# Patient Record
Sex: FEMALE | URBAN - METROPOLITAN AREA
[De-identification: names, ages, dates, MRNs, and addresses within clinical notes are randomized per-mention and may not be internally consistent; named-entity substitution may affect disease eponyms.]

---

## 2021-10-14 ENCOUNTER — PREPPED CHART (OUTPATIENT)
Dept: URBAN - METROPOLITAN AREA CLINIC 49 | Facility: CLINIC | Age: 71
End: 2021-10-14

## 2021-10-14 PROBLEM — H25.13 NS CATARACT: Noted: 2021-10-14

## 2021-10-14 PROBLEM — Z98.890 RETINAL SURGERY: Noted: 2021-10-14

## 2021-10-14 PROBLEM — H43.812 POSTERIOR VITREOUS DETACHMENT: Status: STABILIZING | Noted: 2021-10-14

## 2021-10-14 PROBLEM — H35.341 MACULAR HOLE: Status: WELL-CONTROLLED | Noted: 2021-10-14

## 2021-10-14 PROBLEM — H35.341 MACULAR HOLE: Noted: 2021-10-14

## 2021-10-14 PROBLEM — H43.813 POSTERIOR VITREOUS DETACHMENT: Noted: 2021-10-14

## 2021-10-14 PROBLEM — H25.12 NS CATARACT: Noted: 2021-10-14

## 2021-10-14 PROBLEM — H43.813 POSTERIOR VITREOUS DETACHMENT: Status: STABILIZING | Noted: 2021-10-14

## 2021-10-14 PROBLEM — H35.373 EPIRETINAL MEMBRANE: Noted: 2021-10-14

## 2021-10-14 PROBLEM — H35.341 MACULAR HOLE: Status: STABILIZING | Noted: 2021-10-14

## 2022-03-18 ENCOUNTER — APPOINTMENT (RX ONLY)
Dept: URBAN - METROPOLITAN AREA CLINIC 41 | Facility: CLINIC | Age: 72
Setting detail: DERMATOLOGY
End: 2022-03-18

## 2022-03-18 DIAGNOSIS — Z41.9 ENCOUNTER FOR PROCEDURE FOR PURPOSES OTHER THAN REMEDYING HEALTH STATE, UNSPECIFIED: ICD-10-CM

## 2022-03-18 PROCEDURE — ? COSMETIC CONSULTATION: LASER RESURFACING

## 2022-03-18 PROCEDURE — ? COSMETIC CONSULTATION: BOTOX

## 2022-03-18 PROCEDURE — ? PRESCRIPTION

## 2022-03-18 PROCEDURE — ? COSMETIC CONSULTATION: FILLERS

## 2022-03-18 PROCEDURE — ? ADDITIONAL NOTES

## 2022-03-18 RX ORDER — LIDOCAINE AND PRILOCAINE 25; 25 MG/G; MG/G
CREAM TOPICAL
Qty: 30 | Refills: 0 | Status: ERX | COMMUNITY
Start: 2022-03-18

## 2022-03-18 RX ORDER — VALACYCLOVIR HYDROCHLORIDE 1 G/1
TABLET, FILM COATED ORAL
Qty: 6 | Refills: 0 | Status: ERX | COMMUNITY
Start: 2022-03-18

## 2022-03-18 RX ADMIN — VALACYCLOVIR HYDROCHLORIDE: 1 TABLET, FILM COATED ORAL at 00:00

## 2022-03-18 RX ADMIN — LIDOCAINE AND PRILOCAINE: 25; 25 CREAM TOPICAL at 00:00

## 2022-03-18 ASSESSMENT — LOCATION SIMPLE DESCRIPTION DERM
LOCATION SIMPLE: RIGHT EYELID
LOCATION SIMPLE: RIGHT CHEEK
LOCATION SIMPLE: LEFT TEMPLE
LOCATION SIMPLE: LEFT CHEEK

## 2022-03-18 ASSESSMENT — LOCATION DETAILED DESCRIPTION DERM
LOCATION DETAILED: RIGHT CENTRAL MALAR CHEEK
LOCATION DETAILED: RIGHT INFERIOR CENTRAL MALAR CHEEK
LOCATION DETAILED: LEFT LATERAL MALAR CHEEK
LOCATION DETAILED: LEFT INFERIOR CENTRAL MALAR CHEEK
LOCATION DETAILED: LEFT INFERIOR TEMPLE
LOCATION DETAILED: RIGHT LATERAL CANTHUS

## 2022-03-18 ASSESSMENT — LOCATION ZONE DERM
LOCATION ZONE: FACE
LOCATION ZONE: EYELID

## 2022-03-18 NOTE — PROCEDURE: ADDITIONAL NOTES
Additional Notes: Pt states that her goal is to look less fatigued. \\n\\nEG recommends botox to help loosen eye muscles. Pt denies and has she does not want this\\n\\nEG notes of excess skin to upper eyelid. A blepharoplasty would help with this, however this should be performed by an oculoplastic surgeon. \\n\\nCO2 laser resurfacing can help with the texture of the skin. Should avoid excess sun exposure post procedure. CoolPeel is on the gentler side with shorter recovery time. JO-ANN laser is stronger but has longer recovery time. Pt is leaning towards CoolPeel. However she likes to be out in the sun during the summer, so will consider scheduling this for the fall. EG notes that the CoolPeel can help with some SKs and lentigos on the face. Quoted at $1000 per session! $1200 if combo jo-ann and cool peel, and $2800 for a package of 3 CP.\\n\\nEG also discusses fillers. Takes about 2-4 weeks to integrate into tissue. Side effects may include swelling, bruising, tenderness. Also discusses the risk of occlusion, although hylanex can be used in these situations. EG rec starting with 2 - 3 syringes. Restylane Lyft would be $750 per syringe, and juvaderm voluma would be $1000 per syringe.\\n\\nEG discusses hydra facials, which can do deep cleaning of face and recommends pt see our aestheticians. EG also discusses products pt can use. Pt notes that she washes her face and moisturizes bid. Pt also wears sunscreen. EG notes that this is good and pt should continue, but she can use a post procedure kit with antioxidants that is normally used after cool peel. If pt is considering laser soon, pt should continue with gentle products.\\n\\nPt is most interested in CoolPeel and will consider scheduling for that. Pt was prescribed topical numbing just in case she wants to apply, and instructed to take 6 day course of valtrex starting the day before her procedure. Pt had these eRXed to her preferred pharmacy
Render Risk Assessment In Note?: no
Detail Level: Simple
Additional Notes: Patient consent was obtained to proceed with the visit and recommended plan of care after discussion of all risks and benefits, including the risks of COVID-19 exposure.

## 2022-04-18 ASSESSMENT — TONOMETRY: OD_IOP_MMHG: 17

## 2022-04-18 ASSESSMENT — VISUAL ACUITY: OD_CC: 20/200+1

## 2022-05-09 ENCOUNTER — FOLLOW UP (OUTPATIENT)
Dept: URBAN - METROPOLITAN AREA CLINIC 49 | Facility: CLINIC | Age: 72
End: 2022-05-09

## 2022-05-09 DIAGNOSIS — H35.341: ICD-10-CM

## 2022-05-09 DIAGNOSIS — H35.373: ICD-10-CM

## 2022-05-09 DIAGNOSIS — H43.813: ICD-10-CM

## 2022-05-09 PROCEDURE — 92014 COMPRE OPH EXAM EST PT 1/>: CPT

## 2022-05-09 PROCEDURE — 92134 CPTRZ OPH DX IMG PST SGM RTA: CPT

## 2022-05-09 PROCEDURE — 92202 OPSCPY EXTND ON/MAC DRAW: CPT

## 2022-05-09 ASSESSMENT — VISUAL ACUITY
OD_PH: 20/100-1
OS_CC: 20/40
OD_CC: 20/100-1

## 2022-05-09 ASSESSMENT — TONOMETRY
OD_IOP_MMHG: 13
OS_IOP_MMHG: 11

## 2022-11-17 ENCOUNTER — FOLLOW UP (OUTPATIENT)
Dept: URBAN - METROPOLITAN AREA CLINIC 49 | Facility: CLINIC | Age: 72
End: 2022-11-17

## 2022-11-17 DIAGNOSIS — H35.341: ICD-10-CM

## 2022-11-17 DIAGNOSIS — H35.373: ICD-10-CM

## 2022-11-17 DIAGNOSIS — H43.813: ICD-10-CM

## 2022-11-17 PROCEDURE — 92014 COMPRE OPH EXAM EST PT 1/>: CPT

## 2022-11-17 PROCEDURE — 92201 OPSCPY EXTND RTA DRAW UNI/BI: CPT

## 2022-11-17 PROCEDURE — 92134 CPTRZ OPH DX IMG PST SGM RTA: CPT

## 2022-11-17 ASSESSMENT — VISUAL ACUITY
OD_SC: 20/60
OD_PH: 20/25-2
OS_CC: 20/20

## 2022-11-17 ASSESSMENT — TONOMETRY
OD_IOP_MMHG: 10
OS_IOP_MMHG: 12

## 2023-05-18 ENCOUNTER — FOLLOW UP (OUTPATIENT)
Dept: URBAN - METROPOLITAN AREA CLINIC 49 | Facility: CLINIC | Age: 73
End: 2023-05-18

## 2023-05-18 DIAGNOSIS — H35.352: ICD-10-CM

## 2023-05-18 DIAGNOSIS — H35.372: ICD-10-CM

## 2023-05-18 DIAGNOSIS — H35.341: ICD-10-CM

## 2023-05-18 DIAGNOSIS — H43.813: ICD-10-CM

## 2023-05-18 DIAGNOSIS — H25.12: ICD-10-CM

## 2023-05-18 PROCEDURE — 92014 COMPRE OPH EXAM EST PT 1/>: CPT

## 2023-05-18 PROCEDURE — 92201 OPSCPY EXTND RTA DRAW UNI/BI: CPT

## 2023-05-18 PROCEDURE — 92134 CPTRZ OPH DX IMG PST SGM RTA: CPT

## 2023-05-18 ASSESSMENT — VISUAL ACUITY
OS_CC: 20/30-1
OD_CC: 20/20

## 2023-05-18 ASSESSMENT — TONOMETRY
OD_IOP_MMHG: 14
OS_IOP_MMHG: 15

## 2024-01-25 ENCOUNTER — FOLLOW UP (OUTPATIENT)
Dept: URBAN - METROPOLITAN AREA CLINIC 49 | Facility: CLINIC | Age: 74
End: 2024-01-25

## 2024-01-25 DIAGNOSIS — H35.341: ICD-10-CM

## 2024-01-25 DIAGNOSIS — H35.372: ICD-10-CM

## 2024-01-25 DIAGNOSIS — D31.32: ICD-10-CM

## 2024-01-25 DIAGNOSIS — H35.352: ICD-10-CM

## 2024-01-25 DIAGNOSIS — H43.813: ICD-10-CM

## 2024-01-25 DIAGNOSIS — H26.491: ICD-10-CM

## 2024-01-25 PROCEDURE — 92134 CPTRZ OPH DX IMG PST SGM RTA: CPT

## 2024-01-25 PROCEDURE — 92014 COMPRE OPH EXAM EST PT 1/>: CPT

## 2024-01-25 PROCEDURE — 92202 OPSCPY EXTND ON/MAC DRAW: CPT

## 2024-01-25 ASSESSMENT — VISUAL ACUITY
OS_SC: 20/20
OD_SC: 20/60
OD_PH: 20/30

## 2024-01-25 ASSESSMENT — TONOMETRY
OD_IOP_MMHG: 16
OS_IOP_MMHG: 15

## 2024-07-25 ENCOUNTER — FOLLOW UP (OUTPATIENT)
Dept: URBAN - METROPOLITAN AREA CLINIC 49 | Facility: CLINIC | Age: 74
End: 2024-07-25

## 2024-07-25 DIAGNOSIS — H35.372: ICD-10-CM

## 2024-07-25 DIAGNOSIS — H35.352: ICD-10-CM

## 2024-07-25 DIAGNOSIS — D31.32: ICD-10-CM

## 2024-07-25 DIAGNOSIS — H35.341: ICD-10-CM

## 2024-07-25 DIAGNOSIS — H43.812: ICD-10-CM

## 2024-07-25 PROCEDURE — 92134 CPTRZ OPH DX IMG PST SGM RTA: CPT

## 2024-07-25 PROCEDURE — 92014 COMPRE OPH EXAM EST PT 1/>: CPT

## 2024-07-25 PROCEDURE — 92202 OPSCPY EXTND ON/MAC DRAW: CPT

## 2024-07-25 ASSESSMENT — VISUAL ACUITY
OD_CC: 20/25-2
OS_CC: 20/20-1

## 2024-07-25 ASSESSMENT — TONOMETRY
OS_IOP_MMHG: 12
OD_IOP_MMHG: 12

## 2024-10-04 ENCOUNTER — ESTABLISHED COMPREHENSIVE EXAM (OUTPATIENT)
Dept: URBAN - METROPOLITAN AREA CLINIC 93 | Facility: CLINIC | Age: 74
End: 2024-10-04

## 2024-10-04 DIAGNOSIS — H43.812: ICD-10-CM

## 2024-10-04 DIAGNOSIS — H35.341: ICD-10-CM

## 2024-10-04 DIAGNOSIS — H52.13: ICD-10-CM

## 2024-10-04 DIAGNOSIS — H52.223: ICD-10-CM

## 2024-10-04 DIAGNOSIS — H35.352: ICD-10-CM

## 2024-10-04 DIAGNOSIS — Z96.1: ICD-10-CM

## 2024-10-04 DIAGNOSIS — H35.372: ICD-10-CM

## 2024-10-04 DIAGNOSIS — D31.32: ICD-10-CM

## 2024-10-04 PROCEDURE — 92014 COMPRE OPH EXAM EST PT 1/>: CPT

## 2024-10-04 PROCEDURE — 92015 DETERMINE REFRACTIVE STATE: CPT | Mod: GY

## 2024-10-04 PROCEDURE — 92134 CPTRZ OPH DX IMG PST SGM RTA: CPT

## 2024-10-04 ASSESSMENT — TONOMETRY
OD_IOP_MMHG: 15
OS_IOP_MMHG: 14

## 2024-10-04 ASSESSMENT — VISUAL ACUITY
OS_CC: 20/20-2
OD_CC: 20/30-1